# Patient Record
Sex: FEMALE | Race: OTHER | HISPANIC OR LATINO | Employment: OTHER | URBAN - METROPOLITAN AREA
[De-identification: names, ages, dates, MRNs, and addresses within clinical notes are randomized per-mention and may not be internally consistent; named-entity substitution may affect disease eponyms.]

---

## 2018-07-28 ENCOUNTER — HOSPITAL ENCOUNTER (EMERGENCY)
Facility: HOSPITAL | Age: 78
Discharge: LEFT BEFORE DISCHARGE | End: 2018-07-28
Attending: EMERGENCY MEDICINE
Payer: MEDICARE

## 2018-07-28 VITALS
HEART RATE: 68 BPM | DIASTOLIC BLOOD PRESSURE: 81 MMHG | SYSTOLIC BLOOD PRESSURE: 160 MMHG | OXYGEN SATURATION: 98 % | RESPIRATION RATE: 16 BRPM | TEMPERATURE: 98.3 F

## 2018-07-28 DIAGNOSIS — I10 HYPERTENSION: Primary | ICD-10-CM

## 2018-07-28 DIAGNOSIS — F41.9 ANXIETY: ICD-10-CM

## 2018-07-28 PROCEDURE — 99283 EMERGENCY DEPT VISIT LOW MDM: CPT

## 2018-07-28 RX ORDER — LISINOPRIL 20 MG/1
20 TABLET ORAL DAILY
COMMUNITY

## 2018-07-28 RX ORDER — PRAVASTATIN SODIUM 20 MG
20 TABLET ORAL DAILY
COMMUNITY

## 2018-07-29 NOTE — ED NOTES
Patient out in the syed requesting to see her son and wants to leave  MD made aware of same         Darwin Gautam, SREEKANTH  07/28/18 2041

## 2018-07-29 NOTE — ED NOTES
Patient walking around halls without difficulty  No distress noted       Luz Borden RN  07/28/18 2004

## 2018-07-29 NOTE — DISCHARGE INSTRUCTIONS
Ansiedad   LO QUE NECESITA SABER:   La ansiedad es nataliia condición que provoca que usted sienta preocupación o miedo  El estrés familiar o laboral, fumar, la cafeína y el alcohol pueden aumentar stanley riesgo para sufrir ansiedad  Ciertos medicamentos o condiciones de [address] pueden aumentar stanley riesgo  La ansiedad podría comenzar gradualmente y puede convertirse en nataliia condición a alicia plazo si no se controla o se trata  INSTRUCCIONES [facility]BRE [facility]:   Llame al 911 si presenta:   · Usted tiene dolor de pecho, presión o pesadez que pueden llegar a propagarse a neda hombros, brazos, mandíbula, alexis o espalda    · Usted siente deseos de lastimarse o lastimar a alguien más  Busque atención médica de inmediato si:   · Se siente mareado, aturdido o que se va a desmayar  Pregúntele a stanley [employer] vitaminas y minerales son adecuados para usted  · Neda síntomas empeoran o no mejoran con el tratamiento  · Usted piensa que stanley medicamento podría estar provocándole efectos secundarios  · Stanley ansiedad benjy que realice neda actividades diarias  · Tiene nuevos síntomas desde stanley última consulta  · Usted tiene preguntas o inquietudes acerca de stanley condición o cuidado  Medicamentos:   · Medicamentos  para ayudarle a sentirse más calmado y relajado y disminuir neda síntomas  · [place] neda medicamentos leigh se le haya indicado  Llame a stanley médico si usted piensa que el medicamento no está ayudando o si tiene efectos secundarios  Infórmele si es alérgico a cualquier medicamento  Mantenga nataliia lista actualizada de los [place], las vitaminas y los productos herbales que cl  Incluya los siguientes datos de los medicamentos: cantidad, frecuencia y motivo de administración  Traiga con usted la lista o los envases de la píldoras a neda citas de seguimiento  Lleve la lista de los medicamentos con usted en ovidio de nataliia emergencia    Programe nataliia mateus con stanley médico dentro de 2 semanas o leigh se le indique: Anote margarita preguntas para que se acuerde de hacerlas lima margarita visitas  Maneje la ansiedad:   · Vaya a terapia leigh se le indique  La terapia cognitiva conceptual puede ayudarlo a entender y cambiar la forma que usted reacciona a los eventos que provocan margarita síntomas  · Busque maneras de controlar margarita síntomas  Las [employer] leigh el ejercicio, meditación o escuchar música pueden ayudarlo a relajarse  · Practique la respiración profunda  La respiración puede cambiar la forma leigh reacciona sanders cuerpo al estrés  Enfóquese en respirar lento y profundo varias veces al día, o lima un ataque de ansiedad  Respire por la nariz y exhale por la boca  · No fume  La nicotina puede aumentar sanders ansiedad  No use cigarrillos electrónicos o tabaco sin humo en vez de cigarrillos o para tratar de dejar de fumar  Todos estos aún contienen nicotina  Pida a sanders médico información si usted fuma actualmente y [place] para dejar de hacerlo  · No consuma cafeína  La cafeína puede empeorar margarita síntomas  No consuma alimentos o bebidas que tengan el propósito de aumentar sanders nivel de [place]  · Limite o no consuma bebidas alcohólicas  Pregúntele a sanders médico si usted puede callie alcohol  Es posible que usted no pueda ingerir alcohol si cl ciertos medicamentos para la ansiedad o la depresión  Limite el consumo de alcohol a 1 trago por día si es reed  Si usted es hombre, limite el consumo de alcohol a 2 tragos por día  Un trago equivale a 12 onzas de cerveza, 5 onzas de vino o 1 onza y ½ de licor  © 2016 [address] is for End User's use only and may not be sold, redistributed or otherwise used for commercial purposes  All illustrations and images included in CareNotes® are the copyrighted property of A D A M , Inc  or Jas Oquendo  Esta información es sólo para uso en educación  Sanders intención no es darle un consejo médico sobre enfermedades o tratamientos   Colsulte con stanley [employer] farmacéutico antes de seguir cualquier régimen médico para saber si es seguro y efectivo para usted  Hipertensión crónica, Cuidados ambulatorios   INFORMACIÓN GENERAL:   La hipertensión crónica  es nataliia condición a alicia plazo en la cual stanley presión arterial es más anam de lo normal  La presión arterial es la fuerza que ejerce la aurora contra las lerma de las arterias  La hipertensión es nataliia presión arterial de 140/90 o mucho más elevada  Los siguientes son los síntomas más comunes:   · Dolor de kelly     · Visión borrosa     · Dolor en el pecho     · [employer] o debilidad    · Dificultad para respirar     · Sangrados nasales  Busque atención médica inmediata al presentar los siguientes síntomas:   · Liya dolor de kelly o pérdida de la visión    · Debilidad en un brazo o nataliia pierna     · Confusión o tiene dificultad para hablar claramente    · Siente nataliia molestia en stanley pecho que parece leigh si lo estuvieran apretando, nataliia presión, nataliia pesadez o dolor     · De repente se siente mareado o tiene dificultad para respirar    · Dolor o incomodidad en stanley espalda, alexis, mandíbula, estómago o brazo  El tratamiento para la hipertensión crónica  puede incluir un medicamento para baja la presión arterial  También necesitará hacer un cambio en stanley estilo de brittany  Se debe callie margarita medicamentos exactamente leigh se lo indicaron  Controle la hipertensión crónica:   · Tómese la presión arterial en stanley casa  Siéntese y descanse por 5 minutos antes de tomarse la presión arterial  Extienda stanley brazo y apóyelo en nataliia superficie plana  Stanley brazo debe estar a la misma altura que stanley corazón  Siga las instrucciones que vienen con el monitor para la presión arterial o tensiómetro  Si es posible tome por lo menos 2 lecturas de la presión cada vez  Tómese la presión arterial por lo [employer] al día a la misma hora todos los días, por ejemplo nataliia en la mañana y la otra en la noche   Mantenga un registro de margarita lecturas de stanley presión arterial y llévelo consigo a margarita consultas de control  · Consuma menos sodio  No le añada sodio a los alimentos  Limete el consumo de alimentos que contienen un alto nivel de sodio, leigh los alimentos [place], papitas saladas de [employer]ro[employer], y rosalba para sandwich  Stanley proveedor de lou puede recomendarle que siga el régimen de alimentación denominada enfoque dietético para disminuir la hipertensión (o DASH, por margarita siglas en inglés)  El régimen es bajo en sodio, grasas saturadas y las grasas en total  Es anam en potasio, calcio y [place]  · [place] regular  Freya actividad física que sobrepase los 30 minutos al día jose todos los días de la [place] ayudará a bajar stanley presión arterial  Solicítele a stanley proveedor de [employer] recomiende el plan de actividad física que se ajuste a stanley situación personal      · Limite el consumo de bebidas alcohólicas  Las mujeres deberían limitar stanley consumo de alcohol a 1 trago por día  Los hombres deberían limitar stanley consumo de alcohol a 2 tragos por día  Se considera un trago de alcohol 12 onzas (350 ml) de cerveza, 5 onzas (150 ml) de vino o 1 ½ onza (45 ml) de licor  · No fume  Si usted fuma, nunca es tarde para dejar de fumar  El tabaco puede aumentar stanley presión arterial  El tabaquismo también puede empeorar otras afecciones de lou que usted padezca las cuales pueden aumentar stanley riesgo de presentar hipertensión  Solicite a stanley proveedor de [employer] información si requiere ayuda para dejar de fumar  Elena Cynthia a stanley mateus de control con stanley proveedor de [employer] se lo indicaron:  Usted necesitará regresar para que le revisen stanley presión arterial y para que le ordenen otros exámenes de laboratorio  Escriba las preguntas que tenga para que recuerde hacerlas lima margarita consultas médicas  ACUERDOS SOBRE STANLEY CUIDADO:   Anatoly tiene el derecho de participar en la planificación de stanley cuidado   Aprenda todo lo que pueda [facility] y leigh darle [place]  Discuta con margarita médicos margarita opciones de tratamiento para juntos decidir el cuidado que usted quiere recibir  Usted siempre tiene el derecho a rechazar stanley tratamiento  Esta información es sólo para uso en educación  Stanley intención no es darle un consejo médico sobre enfermedades o tratamientos  Colsulte con stanley Pal[employer] farmacéutico antes de seguir cualquier régimen médico para saber si es seguro y efectivo para usted  © 2014 [address] is for End User's use only and may not be sold, redistributed or otherwise used for commercial purposes  All illustrations and images included in CareNotes® are the copyrighted property of A NESSA A RAHAT , Inc  or Jas Oquendo

## 2018-07-29 NOTE — ED PROVIDER NOTES
History  Chief Complaint   Patient presents with    High Blood Pressure     79-year-old female presents to the emergency department to check on her son who was just brought in after drug and alcohol intoxication  She has a history of hypertension and is very anxious about his drug use  She was here to visit him and stated she thought that her blood pressure might be up  She takes lisinopril 20 mg daily  She denies any headache, chest pain, shortness of breath, or any other symptoms related to her elevated blood pressure  Her primary complaint at this time is that she is very hungry and she thinks that if she eats her blood pressure will come down  Prior to Admission Medications   Prescriptions Last Dose Informant Patient Reported? Taking?   lisinopril (ZESTRIL) 20 mg tablet 7/28/2018 at Unknown time  Yes Yes   Sig: Take 20 mg by mouth daily   pravastatin (PRAVACHOL) 20 mg tablet 7/28/2018 at Unknown time  Yes Yes   Sig: Take 20 mg by mouth daily      Facility-Administered Medications: None       Past Medical History:   Diagnosis Date    Diabetes mellitus ([facility] [address] )     Gastritis     Hyperlipidemia     Hypertension        Past Surgical History:   Procedure Laterality Date    CHOLECYSTECTOMY         History reviewed  No pertinent family history  I have reviewed and agree with the history as documented  Social History   Substance Use Topics    Smoking status: Never Smoker    Smokeless tobacco: Never Used    Alcohol use No        Review of Systems   Constitutional: Negative for appetite change, chills, fatigue and fever  HENT: Negative for postnasal drip, sinus pain and trouble swallowing  Eyes: Negative for redness and itching  Respiratory: Negative for chest tightness, shortness of breath and wheezing  Cardiovascular: Negative for chest pain and leg swelling  Gastrointestinal: Negative for abdominal pain, constipation, diarrhea, nausea and vomiting  Endocrine: Negative  Genitourinary: Negative for difficulty urinating and dysuria  Musculoskeletal: Negative for back pain and myalgias  Skin: Negative for rash  Allergic/Immunologic: Negative  Neurological: Negative for dizziness, numbness and headaches  Hematological: Negative  Psychiatric/Behavioral: Negative  Physical Exam  Physical Exam   Constitutional: She is oriented to person, place, and time  She appears well-developed and well-nourished  HENT:   Head: Normocephalic and atraumatic  Nose: Nose normal    Mouth/Throat: Oropharynx is clear and moist    Eyes: Conjunctivae and EOM are normal  Pupils are equal, round, and reactive to light  Neck: Normal range of motion  Neck supple  Cardiovascular: Normal rate, regular rhythm and normal heart sounds  Pulmonary/Chest: Effort normal and breath sounds normal  No respiratory distress  She has no wheezes  Abdominal: Soft  Bowel sounds are normal  There is no tenderness  There is no guarding  Musculoskeletal: She exhibits no edema, tenderness or deformity  Neurological: She is alert and oriented to person, place, and time  No cranial nerve deficit or sensory deficit  She exhibits normal muscle tone  Coordination normal    Skin: Skin is warm and dry  Capillary refill takes less than 2 seconds  No rash noted  Psychiatric: She has a normal mood and affect  Her behavior is normal    Nursing note and vitals reviewed        Vital Signs  ED Triage Vitals [07/28/18 1948]   Temperature Pulse Respirations Blood Pressure SpO2   98 3 °F (36 8 °C) 68 16 160/81 98 %      Temp Source Heart Rate Source Patient Position - Orthostatic VS BP Location FiO2 (%)   Temporal Monitor Sitting Right arm --      Pain Score       --           Vitals:    07/28/18 1948   BP: 160/81   Pulse: 68   Patient Position - Orthostatic VS: Sitting       Visual Acuity      ED Medications  Medications - No data to display    Diagnostic Studies  Results Reviewed     None                 No orders to display              Procedures  Procedures       Phone Contacts  ED Phone Contact    ED Course                               MDM  Number of Diagnoses or Management Options  Anxiety:   Hypertension:   Diagnosis management comments: Patient had requested to be given food because she thought this would help her blood pressure  Seems that her primary issue that she is very anxious about her son's well-being  Blood pressure is not dangerously elevated at this time  She is now requesting that she be allowed to leave immediately and does not want stay to finish her meal nor to have any type of medical workup or treatment  She is aware of the need to follow up with her family physician as well as reasons for return to the emergency department  CritCare Time    Disposition  Final diagnoses:   None     ED Disposition     None      Follow-up Information    None         Patient's Medications   Discharge Prescriptions    No medications on file     No discharge procedures on file      ED Provider  Electronically Signed by           Amrit Nye DO  07/28/18 7019

## 2018-09-05 ENCOUNTER — HOSPITAL ENCOUNTER (EMERGENCY)
Facility: HOSPITAL | Age: 78
Discharge: HOME | End: 2018-09-05
Attending: EMERGENCY MEDICINE | Admitting: EMERGENCY MEDICINE
Payer: MEDICARE

## 2018-09-05 ENCOUNTER — APPOINTMENT (EMERGENCY)
Dept: RADIOLOGY | Facility: HOSPITAL | Age: 78
End: 2018-09-05
Payer: MEDICARE

## 2018-09-05 VITALS
HEIGHT: 56 IN | DIASTOLIC BLOOD PRESSURE: 92 MMHG | BODY MASS INDEX: 32.04 KG/M2 | OXYGEN SATURATION: 98 % | WEIGHT: 142.42 LBS | SYSTOLIC BLOOD PRESSURE: 159 MMHG | RESPIRATION RATE: 18 BRPM | TEMPERATURE: 97.7 F | HEART RATE: 86 BPM

## 2018-09-05 DIAGNOSIS — M25.511 SHOULDER PAIN, RIGHT: Primary | ICD-10-CM

## 2018-09-05 DIAGNOSIS — S40.011A CONTUSION OF RIGHT SHOULDER, INITIAL ENCOUNTER: ICD-10-CM

## 2018-09-05 PROCEDURE — 99283 EMERGENCY DEPT VISIT LOW MDM: CPT

## 2018-09-05 PROCEDURE — 71045 X-RAY EXAM CHEST 1 VIEW: CPT

## 2018-09-05 PROCEDURE — 73030 X-RAY EXAM OF SHOULDER: CPT

## 2018-09-06 NOTE — ED PROVIDER NOTES
History  Chief Complaint   Patient presents with    Shoulder Injury     patient states that she fell this morning and has pain in the right shoulder and arm, denies hitting head or any LOC     75-year-old female presenting with right shoulder pain  Patient states she was leaving the house to go get milk when she tripped and fell onto the knees and then straight forward with both arms out  She denies hitting her head or loss of consciousness  She reports pain localized at the right shoulder  No numbness tingling or weakness  No previous injury of the right upper extremity            Prior to Admission Medications   Prescriptions Last Dose Informant Patient Reported? Taking?   lisinopril (ZESTRIL) 20 mg tablet   Yes No   Sig: Take 20 mg by mouth daily   pravastatin (PRAVACHOL) 20 mg tablet   Yes No   Sig: Take 20 mg by mouth daily      Facility-Administered Medications: None       Past Medical History:   Diagnosis Date    Gastritis     Hyperlipidemia     Hypertension        Past Surgical History:   Procedure Laterality Date    CHOLECYSTECTOMY         History reviewed  No pertinent family history  I have reviewed and agree with the history as documented  Social History   Substance Use Topics    Smoking status: Never Smoker    Smokeless tobacco: Never Used    Alcohol use No        Review of Systems   All other systems reviewed and are negative  Physical Exam  Physical Exam   Constitutional: She is oriented to person, place, and time  She appears well-developed and well-nourished  No distress  HENT:   Head: Normocephalic and atraumatic  Eyes: Conjunctivae are normal    EOM grossly intact   Neck: Normal range of motion  Neck supple  No JVD present  Cardiovascular: Normal rate  Pulmonary/Chest: Effort normal    Abdominal: Soft     Musculoskeletal:   FROM right upper extremity, no deformity, no tenderness to palpation right shoulder, no erythema ecchymosis or edema, radial and ulnar pulses intact right upper extremity, steady gait, cap refill brisk, strength and sensation grossly intact throughout   Neurological: She is alert and oriented to person, place, and time  Skin: Skin is warm and dry  Capillary refill takes less than 2 seconds  Psychiatric: She has a normal mood and affect  Her behavior is normal    Nursing note and vitals reviewed  Vital Signs  ED Triage Vitals [09/05/18 1957]   Temperature Pulse Respirations Blood Pressure SpO2   97 7 °F (36 5 °C) 86 18 159/92 98 %      Temp Source Heart Rate Source Patient Position - Orthostatic VS BP Location FiO2 (%)   Tympanic Monitor Sitting Left arm --      Pain Score       4           Vitals:    09/05/18 1957   BP: 159/92   Pulse: 86   Patient Position - Orthostatic VS: Sitting       Visual Acuity      ED Medications  Medications - No data to display    Diagnostic Studies  Results Reviewed     None                 XR shoulder 2+ views RIGHT    (Results Pending)   XR chest 1 view portable    (Results Pending)              Procedures  Procedures       Phone Contacts  ED Phone Contact    ED Course                               MDM  Number of Diagnoses or Management Options  Contusion of right shoulder, initial encounter:   Shoulder pain, right:   Diagnosis management comments: 67 yo F presenting after trip and fall onto knees and then onto her stomach with R shoulder pain, xray negative for fx only showing degenerative changes, no tenderness to palpation, pt with full active ROM, pt did not hit her head or LOC, no gross deformity or step off, no ecchymosis or erythema on PE, take tylenol as needed, f/u with pcp and ortho    All labs and imaging discussed with patient, strict return to ED precautions discussed  Pt verbalizes understanding and agrees with plan  Pt is stable for discharge    Portions of the record may have been created with voice recognition software   Occasional wrong word or "sound a like" substitutions may have occurred due to the inherent limitations of voice recognition software  Read the chart carefully and recognize, using context, where substitutions have occurred  CritCare Time    Disposition  Final diagnoses:   Shoulder pain, right   Contusion of right shoulder, initial encounter     Time reflects when diagnosis was documented in both MDM as applicable and the Disposition within this note     Time User Action Codes Description Comment    9/5/2018  8:57 PM Omid Aden Add [M25 511] Shoulder pain, right     9/5/2018  8:57 PM Yoli RICHARDS Add [S40 011A] Contusion of right shoulder, initial encounter       ED Disposition     ED Disposition Condition Comment    Discharge  [facility] discharge to home/self care  Condition at discharge: Good        Follow-up Information     Follow up With Specialties Details Why [id number] [facility] Schedule an appointment as soon as possible for a visit If symptoms worsen [phone] [phone]      [address] Orthopedic Surgery Schedule an appointment as soon as possible for a visit If symptoms worsen [place] [phone]  [phone]          Discharge Medication List as of 9/5/2018  8:57 PM      CONTINUE these medications which have NOT CHANGED    Details   lisinopril (ZESTRIL) 20 mg tablet Take 20 mg by mouth daily, Historical Med      pravastatin (PRAVACHOL) 20 mg tablet Take 20 mg by mouth daily, Historical Med           No discharge procedures on file      ED Provider  Electronically Signed by           Cristian Herman PA-C  09/05/18 8873

## 2018-09-06 NOTE — DISCHARGE INSTRUCTIONS
Contusión en adultos   LO QUE NECESITA SABER:   Davon Moise contusión es un moretón que aparece en la piel después de nataliia Buck Broccoli  Un moretón se forma cuando se rompen los vasos sanguíneos pequeños, nic no se rompe la piel  Cuando los vasos sanguíneos se desgarran, la aurora se filtra a los tejidos cercanos, leigh los tejidos blandos o los músculos  INSTRUCCIONES SOBRE EL [facility]ITAL[facility]:   Regrese a la alton de emergencias si:   · Le surge dificultad para  el área lesionada  · Usted tiene hormigueo o entumecimiento en el área lesionada o cerca de [place]  · El área de stanley mano o pie que se encuentra debajo del moretón se pone fría o pálida  Pregúntele a stanley [place] vitaminas y minerales son adecuados para usted  · Usted encuentra un bulto nuevo en el área lesionada  · Neda síntomas no mejoran después de 4 ó 5 días de [place]  · Usted tiene preguntas o inquietudes acerca de stanley condición o cuidado  Medicamentos:  Es posible que usted necesite alguno de los siguientes:  · AINEs (Analgésicos antiinflamatorios no esteroides)  pueden disminuir la inflamación y el dolor o la fiebre  Kezia medicamento esta disponible con o sin nataliia receta médica  Los AINEs pueden causar sangrado estomacal o problemas renales en ciertas personas  Si usted cl un medicamento anticoagulante, siempre pregúntele a stanley médico si los IFEANYI son seguros para usted  Siempre shayy la etiqueta de kezia medicamento y [place] instrucciones  · Un medicamento con receta para el dolor  podrían ser Alondra Kast  No espere a que el dolor sea muy intenso para callie el medicamento  · [place] neda medicamentos leigh se le haya indicado  Consulte con stanley médico si usted jennifer que stanley medicamento no le está ayudando o si presenta efectos secundarios  Infórmele si es alérgico a algún medicamento  Mantenga nataliia lista actualizada de los [place], las vitaminas y los productos herbales que cl   Incluya los siguientes datos de los medicamentos: cantidad, frecuencia y motivo de administración  Traiga con usted la lista o los envases de la píldoras a margarita citas de seguimiento  Lleve la lista de los medicamentos con usted en ovidio de nataliia emergencia  Acuda a margarita consultas de control con stanley médico según le indicaron  Es posible que deba regresar dentro de nataliia semana para que le vuelvan a revisar la lesión  Anote margarita preguntas para que se acuerde de hacerlas lima margarita visitas  Ayude a que stanley contusión sane:   · [address] [place] [address] menos que de [facility]  Si a usted le salieron moretones en stanley pierna o pie, es posible que deba usar muletas o un bastón para caminar  [place] le ayudará a no apoyarse en la parte lesionada del cuerpo  · Aplique hielo  para bajar la inflamación y calmar el dolor  El hielo también puede contribuir a evitar el daño de los tejidos  Use un paquete de hielo o ponga hielo molido dentro de [employer]  Cubra el hielo con nataliia toalla y colóquelo sobre el moretón lima 15 a 20 minutos cada hora o según le indiquen  · Use compresión  para apoyar Valiant Comerio y disminuir la hinchazón  Coloque un vendaje elástico alrededor de la luz elena sobre el músculo lesionado  Asegúrese de que el vendaje no quede demasiado apretado  Se debería poder meter 1 dedo entre el vendaje y la piel  · Eleve la parte lesionada de stanley cuerpo  por encima del nivel de stanley corazón para ayudar a aliviar el dolor y la inflamación  Use almohadas, cobijas o toallas enrolladas para elevar el área tan a menudo leigh sea posible  · No consuma alcohol  según las indicaciones  El alcohol puede retardar la curación  · No estire los músculos lesionados  inmediatamente después de la lesión  Pregunte a stanley médico cuándo y cómo se puede estirar con precaución después de stanley lesión  Los estiramientos suaves pueden ayudar a aumentar la flexibilidad  · No masajee el área ni utilice almohadillas térmicas  en la contusión inmediatamente después de la lesión   El calor y los masajes podrían retrasar la sanación  Sanders médico podría indicarle que aplique calor después de varios días  En abdi momento, el calor comenzará a servir para sanar la lesión  Evite otra contusión:   · Estírese y [place] en calor antes de practicar deportes o hacer ejercicio  · Use equipo de protección cuando practique deportes  Billy Masters son Tamiko Dace y las prendas [facility]  · Si comienza a hacer nataliia nueva actividad física, empiece poco a poco para darle tiempo al cuerpo de acostumbrarse  © 2017 [address] Information is for End User's use only and may not be sold, redistributed or otherwise used for commercial purposes  All illustrations and images included in CareNotes® are the copyrighted property of A D A M , Inc  or Jas Oquendo  Esta información es sólo para uso en educación  Sanders intención no es darle un consejo médico sobre enfermedades o tratamientos  Colsulte con sanders Abdoulaye Pier farmacéutico antes de seguir cualquier régimen médico para saber si es seguro y efectivo para usted

## 2020-03-20 ENCOUNTER — TRANSCRIBE ORDERS (OUTPATIENT)
Dept: ADMINISTRATIVE | Facility: HOSPITAL | Age: 80
End: 2020-03-20

## 2020-03-20 DIAGNOSIS — N64.4 BREAST PAIN, LEFT: Primary | ICD-10-CM

## 2020-03-22 ENCOUNTER — HOSPITAL ENCOUNTER (EMERGENCY)
Facility: HOSPITAL | Age: 80
Discharge: HOME | End: 2020-03-22
Attending: EMERGENCY MEDICINE | Admitting: EMERGENCY MEDICINE
Payer: MEDICARE

## 2020-03-22 VITALS
WEIGHT: 140 LBS | OXYGEN SATURATION: 100 % | HEART RATE: 87 BPM | DIASTOLIC BLOOD PRESSURE: 89 MMHG | TEMPERATURE: 97.8 F | SYSTOLIC BLOOD PRESSURE: 174 MMHG | BODY MASS INDEX: 31.39 KG/M2 | RESPIRATION RATE: 18 BRPM

## 2020-03-22 DIAGNOSIS — Z76.0 MEDICATION REFILL: Primary | ICD-10-CM

## 2020-03-22 PROCEDURE — 99284 EMERGENCY DEPT VISIT MOD MDM: CPT

## 2020-03-22 PROCEDURE — 93005 ELECTROCARDIOGRAM TRACING: CPT

## 2020-03-22 PROCEDURE — 99284 EMERGENCY DEPT VISIT MOD MDM: CPT | Performed by: PHYSICIAN ASSISTANT

## 2020-03-22 RX ORDER — MECLIZINE HCL 12.5 MG/1
50 TABLET ORAL ONCE
Status: DISCONTINUED | OUTPATIENT
Start: 2020-03-22 | End: 2020-03-22 | Stop reason: HOSPADM

## 2020-03-22 RX ORDER — ACETAMINOPHEN 500 MG
500 TABLET ORAL EVERY 6 HOURS PRN
Qty: 30 TABLET | Refills: 0 | Status: SHIPPED | OUTPATIENT
Start: 2020-03-22

## 2020-03-22 RX ORDER — ACETAMINOPHEN 325 MG/1
650 TABLET ORAL ONCE
Status: DISCONTINUED | OUTPATIENT
Start: 2020-03-22 | End: 2020-03-22 | Stop reason: HOSPADM

## 2020-03-22 NOTE — ED PROVIDER NOTES
History  Chief Complaint   Patient presents with    Dizziness     3 days, denies falls  Patient is an 24-year-old female who presents today for evaluation of multiple complaints   in the room reporting the patient is stating she has an appointment with a specialist for Tuesday of this coming week however is complaining of multiple complaints including chronic hypertension chronic hyperlipidemia all of which she would like tested at this time however denies any chest pain or shortness of breath  Patient reports only gradual dizziness ongoing intermittently for 3 days for which she has not noted exacerbating or alleviating factors, when asked multiple times what her main complaint is  Patient reports she feels as though she is off balance and reports she has had this sensation before and reports she does not take any medications for this  Patient denies any recent falls, numbness or tingling or weakness of any kind  Patient continues to ramble throughout history taking and is reporting she has a specialist appointment coming up  The patient was advised to continue to make her specialist appointments and family care doctor appointments and large discuss her chronic concerns  Patient refuses to undergo CT scanning as she reports she has had a CT scan recently  Patient also refuses blood work and refused the Tylenol and meclizine pills nursing staff attempted to provide at this facility  Patient was asked multiple times what she would like and reported she only wanted a Tylenol prescription  Prior to Admission Medications   Prescriptions Last Dose Informant Patient Reported?  Taking?   lisinopril (ZESTRIL) 20 mg tablet   Yes No   Sig: Take 20 mg by mouth daily   pravastatin (PRAVACHOL) 20 mg tablet   Yes No   Sig: Take 20 mg by mouth daily      Facility-Administered Medications: None       Past Medical History:   Diagnosis Date    Gastritis     Hyperlipidemia     Hypertension Past Surgical History:   Procedure Laterality Date    CHOLECYSTECTOMY         History reviewed  No pertinent family history  I have reviewed and agree with the history as documented  E-Cigarette/Vaping     E-Cigarette/Vaping Substances     Social History     Tobacco Use    Smoking status: Never Smoker    Smokeless tobacco: Never Used   Substance Use Topics    Alcohol use: No    Drug use: No       Review of Systems   Constitutional: Negative for chills, fatigue and fever  HENT: Negative for congestion, ear pain, rhinorrhea and sore throat  Eyes: Negative for redness  Respiratory: Negative for chest tightness and shortness of breath  Cardiovascular: Negative for chest pain and palpitations  Gastrointestinal: Negative for abdominal pain, nausea and vomiting  Genitourinary: Negative for dysuria and hematuria  Musculoskeletal: Negative  Skin: Negative for rash  Neurological: Positive for dizziness and headaches  Negative for syncope, light-headedness and numbness  Physical Exam  Physical Exam   Constitutional: She is oriented to person, place, and time  She appears well-developed and well-nourished  Short stature woman, appears stated age   HENT:   Head: Normocephalic  Eyes: Pupils are equal, round, and reactive to light  EOM are normal  No scleral icterus  Cardiovascular: Normal rate and regular rhythm  Pulmonary/Chest: Effort normal and breath sounds normal  No stridor  Abdominal: Soft  She exhibits no distension  There is no tenderness  Musculoskeletal: Normal range of motion  Neurological: She is alert and oriented to person, place, and time  No cranial nerve deficit  GCS eye subscore is 4  GCS verbal subscore is 5  GCS motor subscore is 6  Skin: Skin is warm and dry  Capillary refill takes less than 2 seconds  Psychiatric: She has a normal mood and affect  Nursing note and vitals reviewed        Vital Signs  ED Triage Vitals [03/22/20 1321] Temperature Pulse Respirations Blood Pressure SpO2   97 8 °F (36 6 °C) 87 18 (!) 174/89 100 %      Temp src Heart Rate Source Patient Position - Orthostatic VS BP Location FiO2 (%)   -- -- Sitting Left arm --      Pain Score       --           Vitals:    03/22/20 1321   BP: (!) 174/89   Pulse: 87   Patient Position - Orthostatic VS: Sitting         Visual Acuity      ED Medications  Medications   meclizine (ANTIVERT) tablet 50 mg (50 mg Oral Not Given 3/22/20 1346)   acetaminophen (TYLENOL) tablet 650 mg (650 mg Oral Not Given 3/22/20 1346)       Diagnostic Studies  Results Reviewed     None                 No orders to display              Procedures  ECG 12 Lead Documentation Only  Date/Time: 3/22/2020 2:09 PM  Performed by: Jose Burk PA-C  Authorized by: Jose Burk PA-C     Indications / Diagnosis:  Reported dizziness  ECG reviewed by me, the ED Provider: yes    Patient location:  ED  Interpretation:     Interpretation: normal    Rate:     ECG rate:  75    ECG rate assessment: normal    Rhythm:     Rhythm: sinus rhythm    Ectopy:     Ectopy: none    QRS:     QRS axis:  Left  Conduction:     Conduction: normal    ST segments:     ST segments:  Normal  T waves:     T waves: normal    Comments:      NANCIE Pascual             ED Course  ED Course as of Mar 22 1409   Sun Mar 22, 2020   1333 Patient adamantly refuses CT scan despite complaint of dizziness and headache  Interpretation by emergency department technician at bedside [facility] patient reporting she "only wants a pill for the headache"  Patient is agreeable to an EKG will obtain EKG  Neurologic exam is without abnormal findings  Patient denies any recent trauma  Will provide meclizine and an EKG as patient is adamantly refusing other workup                  Stroke Assessment     Row Name 03/22/20 1408             NIH Stroke Scale    Interval  Baseline      Level of Consciousness (1a )  0      LOC Questions (1b )  0      LOC Commands (1c )  0 Best Gaze (2 )  0      Visual (3 )  0 normal unchanged for patient      Facial Palsy (4 )  0      Motor Arm, Left (5a )  0      Motor Arm, Right (5b )  0      Motor Leg, Left (6a )  0      Motor Leg, Right (6b )  0      Limb Ataxia (7 )  0      Sensory (8 )  0      Best Language (9 )  0      Dysarthria (10 )  0      Extinction and Inattention (11 ) (Formerly Neglect)  0      Total  0          First Filed Value   TPA Decision  Patient not a TPA candidate  Patient is not a candidate options  Symptoms resolved/clearly non disabling  MDM  Number of Diagnoses or Management Options  Medication refill:   Diagnosis management comments: All imaging and/or lab testing discussed with patient, strict return to ED precautions discussed  Patient and/or family members verbalizes understanding and agrees with plan  Patient is stable for discharge     Portions of the record may have been created with voice recognition software  Occasional wrong word or "sound a like" substitutions may have occurred due to the inherent limitations of voice recognition software  Read the chart carefully and recognize, using context, where substitutions have occurred  Disposition  Final diagnoses:   Medication refill     Time reflects when diagnosis was documented in both MDM as applicable and the Disposition within this note     Time User Action Codes Description Comment    3/22/2020  1:56 PM Mariah Hogananita [Z76 [id number]] Medication refill       ED Disposition     ED Disposition Condition Date/Time Comment    Discharge Stable Sun Mar 22, 2020  1:55 PM Otilia Newberry discharge to home/self care              Follow-up Information     Follow up With Specialties Details Why Contact Info    Ryann Santos MD Family Medicine Schedule an appointment as soon as possible for a visit   [facility]             Discharge Medication List as of 3/22/2020  1:58 PM      START taking these medications    Details   acetaminophen (TYLENOL) 500 mg tablet Take 1 tablet (500 mg total) by mouth every 6 (six) hours as needed for mild pain, Starting Sun 3/22/2020, Print         CONTINUE these medications which have NOT CHANGED    Details   lisinopril (ZESTRIL) 20 mg tablet Take 20 mg by mouth daily, Historical Med      pravastatin (PRAVACHOL) 20 mg tablet Take 20 mg by mouth daily, Historical Med           No discharge procedures on file      PDMP Review     None          ED Provider  Electronically Signed by           Chastity Sargent PA-C  03/22/20 [address] [place], [place] [address]WILLIAMS  03/22/20 1059

## 2020-03-23 LAB
ATRIAL RATE: 75 BPM
P AXIS: -14 DEGREES
PR INTERVAL: 166 MS
QRS AXIS: -18 DEGREES
QRSD INTERVAL: 82 MS
QT INTERVAL: 388 MS
QTC INTERVAL: 433 MS
T WAVE AXIS: 63 DEGREES
VENTRICULAR RATE: 75 BPM

## 2020-03-23 PROCEDURE — 93010 ELECTROCARDIOGRAM REPORT: CPT | Performed by: INTERNAL MEDICINE

## 2020-03-24 ENCOUNTER — HOSPITAL ENCOUNTER (OUTPATIENT)
Dept: MAMMOGRAPHY | Facility: CLINIC | Age: 80
Discharge: HOME | End: 2020-03-24
Payer: MEDICARE

## 2020-03-24 VITALS — WEIGHT: 140 LBS | BODY MASS INDEX: 31.49 KG/M2 | HEIGHT: 56 IN

## 2020-03-24 DIAGNOSIS — N64.4 BREAST PAIN, LEFT: ICD-10-CM

## 2020-03-24 PROCEDURE — 77066 DX MAMMO INCL CAD BI: CPT

## 2020-03-24 PROCEDURE — 76642 ULTRASOUND BREAST LIMITED: CPT

## 2020-03-24 PROCEDURE — G0279 TOMOSYNTHESIS, MAMMO: HCPCS

## 2020-03-25 ENCOUNTER — TELEPHONE (OUTPATIENT)
Dept: MAMMOGRAPHY | Facility: CLINIC | Age: 80
End: 2020-03-25

## 2020-03-25 NOTE — PROGRESS NOTES
Per Dr Reyes Form, he saw pt on 3/24/20 @ [facility] for left breast pain with dx mammo/US being performed with a recommendation for left breast US Guided Biopsy  Per Dr Reyes Form these recommendations were discussed with the patient at the time of exam through the [place] interpretation line  The patient became very upset and stated that she did not want the biopsy and would not discuss the biopsy procedure  I attempted to contact the pt using the phone numbers listed in the chart - [phone] and a man answered the phone and stated that there was no one by pt's name at this #  Tried 2 other #'s that were not in service  I called the [facility] @ [phone] and spoke to Chris Hernandes who was aware of the pt as she assisted pt with getting dx imaging  I informed Keyla Ask that pt needs a left breast bx and attempted to contact her to no avail and do they have another #  I informed her of pt's reaction when told she needed a bx and that pt got very angry and left the breast center  Keyla Ask also attempted to contact pt with the same results as mine  I also informed Keyla Ask that pt would need to come to [facility] for the biopsy  However, if that was not possible pt could go to [facility] for her biopsy and I would call [facility] to inform them as they will need pt's imaging from [place]  She will send pt a letter informing pt to come to their clinic, as pt lives within walking distance, and then they will talk to her about the recommendation  Keyla Ask stated that if pt does not respond to the letter then they may send a nurse to her home  Keyla Su will keep me updated  If no update by 4/1 I will call Keyla Su back

## 2020-03-29 ENCOUNTER — APPOINTMENT (EMERGENCY)
Dept: RADIOLOGY | Facility: HOSPITAL | Age: 80
End: 2020-03-29
Payer: MEDICARE

## 2020-03-29 ENCOUNTER — HOSPITAL ENCOUNTER (EMERGENCY)
Facility: HOSPITAL | Age: 80
Discharge: HOME | End: 2020-03-29
Attending: EMERGENCY MEDICINE | Admitting: EMERGENCY MEDICINE
Payer: MEDICARE

## 2020-03-29 VITALS
DIASTOLIC BLOOD PRESSURE: 85 MMHG | HEART RATE: 81 BPM | SYSTOLIC BLOOD PRESSURE: 151 MMHG | OXYGEN SATURATION: 100 % | BODY MASS INDEX: 31.53 KG/M2 | TEMPERATURE: 97.9 F | WEIGHT: 140.65 LBS | RESPIRATION RATE: 18 BRPM

## 2020-03-29 DIAGNOSIS — R53.1 WEAKNESS: Primary | ICD-10-CM

## 2020-03-29 DIAGNOSIS — R53.83 FATIGUE: ICD-10-CM

## 2020-03-29 LAB
ALBUMIN SERPL BCP-MCNC: 3.6 G/DL (ref 3–5.2)
ALP SERPL-CCNC: 60 U/L (ref 43–122)
ALT SERPL W P-5'-P-CCNC: 35 U/L (ref 9–52)
ANION GAP SERPL CALCULATED.3IONS-SCNC: 9 MMOL/L (ref 5–14)
APTT PPP: 30 SECONDS (ref 23–37)
AST SERPL W P-5'-P-CCNC: 39 U/L (ref 14–36)
BASOPHILS # BLD AUTO: 0.05 THOUSAND/UL (ref 0–0.1)
BASOPHILS NFR MAR MANUAL: 1 % (ref 0–1)
BILIRUB SERPL-MCNC: 0.1 MG/DL
BUN SERPL-MCNC: 20 MG/DL (ref 5–25)
CALCIUM SERPL-MCNC: 8.3 MG/DL (ref 8.4–10.2)
CHLORIDE SERPL-SCNC: 81 MMOL/L (ref 97–108)
CO2 SERPL-SCNC: 30 MMOL/L (ref 22–30)
CREAT SERPL-MCNC: 0.52 MG/DL (ref 0.6–1.2)
EOSINOPHIL # BLD AUTO: 0.05 THOUSAND/UL (ref 0–0.4)
EOSINOPHIL NFR BLD MANUAL: 1 % (ref 0–6)
ERYTHROCYTE [DISTWIDTH] IN BLOOD BY AUTOMATED COUNT: 13.9 %
GFR SERPL CREATININE-BSD FRML MDRD: 91 ML/MIN/1.73SQ M
GLUCOSE SERPL-MCNC: 89 MG/DL (ref 70–99)
HCT VFR BLD AUTO: 37 % (ref 36–46)
HGB BLD-MCNC: 12.9 G/DL (ref 12–16)
INR PPP: 1.06 (ref 0.84–1.19)
LIPASE SERPL-CCNC: 258 U/L (ref 23–300)
LYMPHOCYTES # BLD AUTO: 1.86 THOUSAND/UL (ref 0.5–4)
LYMPHOCYTES # BLD AUTO: 35 % (ref 25–45)
MCH RBC QN AUTO: 30.4 PG (ref 26–34)
MCHC RBC AUTO-ENTMCNC: 34.8 G/DL (ref 31–36)
MCV RBC AUTO: 87 FL (ref 80–100)
MONOCYTES # BLD AUTO: 0.8 THOUSAND/UL (ref 0.2–0.9)
MONOCYTES NFR BLD AUTO: 15 % (ref 1–10)
NEUTS BAND NFR BLD MANUAL: 12 % (ref 0–8)
NEUTS SEG # BLD: 2.44 THOUSAND/UL (ref 1.8–7.8)
NEUTS SEG NFR BLD AUTO: 34 %
PLATELET # BLD AUTO: 275 THOUSANDS/UL (ref 150–450)
PLATELET BLD QL SMEAR: ADEQUATE
PMV BLD AUTO: 7.3 FL (ref 8.9–12.7)
POTASSIUM SERPL-SCNC: 3.4 MMOL/L (ref 3.6–5)
PROT SERPL-MCNC: 6.9 G/DL (ref 5.9–8.4)
PROTHROMBIN TIME: 13.2 SECONDS (ref 11.6–14.5)
RBC # BLD AUTO: 4.23 MILLION/UL (ref 4–5.2)
RBC MORPH BLD: NORMAL
SODIUM SERPL-SCNC: 120 MMOL/L (ref 137–147)
TOTAL CELLS COUNTED SPEC: 100
TROPONIN I SERPL-MCNC: <0.01 NG/ML (ref 0–0.03)
VARIANT LYMPHS # BLD AUTO: 2 % (ref 0–0)
WBC # BLD AUTO: 5.3 THOUSAND/UL (ref 4.5–11)

## 2020-03-29 PROCEDURE — 93005 ELECTROCARDIOGRAM TRACING: CPT

## 2020-03-29 PROCEDURE — 80053 COMPREHEN METABOLIC PANEL: CPT | Performed by: PHYSICIAN ASSISTANT

## 2020-03-29 PROCEDURE — 83690 ASSAY OF LIPASE: CPT | Performed by: PHYSICIAN ASSISTANT

## 2020-03-29 PROCEDURE — 85610 PROTHROMBIN TIME: CPT | Performed by: PHYSICIAN ASSISTANT

## 2020-03-29 PROCEDURE — 85007 BL SMEAR W/DIFF WBC COUNT: CPT | Performed by: PHYSICIAN ASSISTANT

## 2020-03-29 PROCEDURE — 99285 EMERGENCY DEPT VISIT HI MDM: CPT

## 2020-03-29 PROCEDURE — 85730 THROMBOPLASTIN TIME PARTIAL: CPT | Performed by: PHYSICIAN ASSISTANT

## 2020-03-29 PROCEDURE — 99285 EMERGENCY DEPT VISIT HI MDM: CPT | Performed by: PHYSICIAN ASSISTANT

## 2020-03-29 PROCEDURE — 36415 COLL VENOUS BLD VENIPUNCTURE: CPT | Performed by: PHYSICIAN ASSISTANT

## 2020-03-29 PROCEDURE — 84484 ASSAY OF TROPONIN QUANT: CPT | Performed by: PHYSICIAN ASSISTANT

## 2020-03-29 PROCEDURE — 71046 X-RAY EXAM CHEST 2 VIEWS: CPT

## 2020-03-29 PROCEDURE — 85027 COMPLETE CBC AUTOMATED: CPT | Performed by: PHYSICIAN ASSISTANT

## 2020-03-29 RX ADMIN — SODIUM CHLORIDE 1000 ML: 0.9 INJECTION, SOLUTION INTRAVENOUS at 16:42

## 2020-03-29 NOTE — DISCHARGE INSTRUCTIONS
CoVID-19 Jacob de cuidado domiciliario  Stanley proveedor de [employer] y/o personal de lou pública lo/la ha evaluado y ha determinado que no necesita ser hospitalizado/a en kylah momento  ? En kylah momento usted puede ser aislado/a en casa donde será monitoreado/a por el personal de stanley departamento de lou local o estatal  Debe seguir cuidadosamente los pasos de prevención y aislamiento que se indican a continuación, hasta que un proveedor de atención médica o el departamento de lou local o estatal indique que puede volver a margarita actividades normales  Quédate en casa excepto si necesita recibir [employer] que están levemente enfermas con COVID-19 pueden aislarse en casa lima stanley recuperacion  Usted debe restringir las actividades fuera de stanley hogar, excepto para recibir [employer]  No se presente al [place], a la escuela o en áreas públicas  Evite el uso del transporte público, el uso compartido de transporte o los taxis  Aislese de [employer] y [employer] en stanley domicilio  Personas: En la medida de lo posible, debe quedarte en nataliia habitación específica y lejos de otras personas en stanley hogar  Además, debe usar un baño separado, si está disponible  Animales: Debe restringir el contacto con mascotas y otros animales [address] enfermo/a con COVID-19, al igual que lo haría con [employer]  Aunque no hdz habido informes de mascotas u otros animales que se enferman con COVID-19, todavía se recomienda que las personas enfermas con COVID-19 limiten el contacto con los animales hasta que se sepa más información sobre el virus  En lo posible, pida a otro miembro de stanley hogar que cuide de margarita animales mientras esté enfermo/a  Si está enfermo/a con COVID-19, evite el contacto con stanley mascota, incluyendo acariciar, abrazar, besar o ser lamido/a, al igual que compartir alimentos   Si debe cuidar de stanley mascota o estar cerca de [employer] está enfermo/a, lávese las miki antes y KOHALIMA de interactuar con las mascotas y use nataliia máscara facial  Consulte COVID-19 y Animales para obtener más información  Llame antes de visitar a stanley médico  Si tiene [employer], llame al proveedor de [employer] [employer] y dígale que tiene o puede tener COVID-19  [place] ayudará al [employer] del proveedor de atención médica a callie medidas para evitar que otras personas se infecten o expongan  Utilize máscara facial  Debe usar mascara facial cuando esté cerca de otras personas (por ejemplo, compartir nataliia habitación o vehículo) o mascotas y antes de entrar en la oficina de un proveedor de [employer] [employer]  Si usted no puede usar máscara facial (por ejemplo, porque causa problemas para respirar), entonces las personas que viven con usted no deben permanecer en la misma habitación con usted, o deben usar máscara facial si entran a stanley habitación  Nick Dings stanley tos y/o estornudos   Cúbrase la boca y la Garold Indu con un pañuelo desechable cuando tosa o estornude  Tire los pañuelos usados en un contenedor de basura que tenga cobertura  Lávese las miki inmediatamente con agua y jabón lima al menos 21 segundos o, si no hay agua y jabón disponibles, límpiese las miki con un desinfectante de miki a base de alcohol que contenga al menos un 60% de alcohol  Límpiese las miki con frecuencia  [employer] las miki a menudo con agua y jabón lima al menos 20 segundos, especialmente después de sonarse la nariz, toser o estornudar, ir al baño, y antes de comer o preparar alimentos  Si agua y jabón no están disponibles fácilmente, utilize un desinfectante de miki a base de alcohol con al menos un 60% de alcohol, cubriendo todas las superficies de margarita miki y frotándolas hasta que se sientan secas  Cely Rodrigo y agua son la mejor opción si las miki están visiblemente sucias  Evite tocarse los ojos, la nariz y la boca con las miki sin Ronelle Loose    Evite compartir artículos personales en el hogar  No debe compartir platos, vasos para beber, tazas, utensilios para comer, toallas o ropa de cama con otras personas o mascotas en stanley hogar  Después de [employer], deben lavarse muy anthony con agua y [place]  Limpie todas las superficies "de toque frequente" todos los [address] superficies de toque fecuente incluyen encimeras o [place], mesas, escritorios, pomos o perillas de johnnie, accesorios de baño, inodoros, teléfonos, teclados, tabletas y [address] de noche  Además, limpie las superficies que puedan contener aurora, heces fecales o líquidos corporales  Utilice un spray de limpieza para el hogar o nataliia toallita desechable, de acuerdo con las instrucciones de la etiqueta del product utilizado para limpiar  Las [employer] contienen instrucciones para un uso seguro y eficaz del producto de limpieza, incluidas las precauciones que deben tomarse al aplicar el producto, incluyendo el uso de guantes y asegurarse de que haya nataliia buena ventilación lima el uso del producto  Monitorea margarita síntomas  Busca atención médica inmediata si tu enfermedad está empeorando (por ejemplo, dificultad para respirar)  Antes de buscar [employer], llame a stanley proveedor de [employer] y tommy que tiene, o está siendo evaluado para, COVID-19  [place] nataliia mascara facial antes de entrar en las instalaciones  Estos pasos ayudarán al [employer] del proveedor de atención médica a evitar que otras personas en la oficina o la alton de espera se infecten o expongan  [place] a stanley proveedor de [employer] llame al departamento de lou local o estatal  Las personas que se sometan a un seguimiento activo o que se les facilite la autosupervisión deben seguir las instrucciones proporcionadas por stanley departamento de lou local o profesionales de lou ocupacional, según sea apropiado  Si tiene nataliia emergencia médica y necesita llamar al 911, notifique al personal de despacho que tiene o está siendo evaluado para COVID-19   Si es posible, pongase nataliia mascara facial antes de que lleguen los servicios médicos de emergencia  Descontinuacion de [employer] domiciliario  Los pacientes con COVID-19 confirmados deben permanecer bajo precauciones de aislamiento domiciliario hasta que se crea que el riesgo de transmisión secundaria a otros es bajo  La decisión de suspender las precauciones de aislamiento domiciliario deben tomarse de acuerdo a cada ovidio, en consulta con los proveedores de atención médica y los departamentos de lou estatales y locales      Source: [employer] fi

## 2020-03-29 NOTE — ED PROVIDER NOTES
History  Chief Complaint   Patient presents with    Weakness - Generalized     [de-identified] yo F with PMH gastritis, hyperlipidemia and hypertension presenting for evaluation of generalized weakness  Pt reports she follows with our [facility] [place] and had same complaint and was seen for it 3 days ago on Friday  Pt states they did 'ekg and echo and I dont like how they treated me so I came here'  Pt reports generalized weakness and mucous production  Denies cough, sob, cp, abdominal pain, n/v/d, dizziness, headache, blurry vision or loss of vision  She denies any sick contacts or travel outside of the immediate area  Prior to Admission Medications   Prescriptions Last Dose Informant Patient Reported?  Taking?   acetaminophen (TYLENOL) 500 mg tablet   No No   Sig: Take 1 tablet (500 mg total) by mouth every 6 (six) hours as needed for mild pain   lisinopril (ZESTRIL) 20 mg tablet   Yes No   Sig: Take 20 mg by mouth daily   pravastatin (PRAVACHOL) 20 mg tablet   Yes No   Sig: Take 20 mg by mouth daily      Facility-Administered Medications: None       Past Medical History:   Diagnosis Date    Gastritis     Hyperlipidemia     Hypertension        Past Surgical History:   Procedure Laterality Date    CHOLECYSTECTOMY         Family History   Problem Relation Age of Onset    Breast cancer Mother     No Known Problems Father     No Known Problems Sister     No Known Problems Daughter     No Known Problems Maternal Grandmother     No Known Problems Maternal Grandfather     No Known Problems Paternal Grandmother     No Known Problems Paternal Grandfather     No Known Problems Sister     No Known Problems Sister     No Known Problems Sister     No Known Problems Sister     Breast cancer Sister     No Known Problems Sister     No Known Problems Sister     No Known Problems Sister     No Known Problems Daughter     No Known Problems Daughter     No Known Problems Daughter     No Known Problems Daughter  Prostate cancer Brother     No Known Problems Paternal Aunt     No Known Problems Paternal Aunt     No Known Problems Maternal Aunt     No Known Problems Maternal Aunt      I have reviewed and agree with the history as documented  E-Cigarette/Vaping    E-Cigarette Use Never User      E-Cigarette/Vaping Substances     Social History     Tobacco Use    Smoking status: Never Smoker    Smokeless tobacco: Never Used   Substance Use Topics    Alcohol use: No    Drug use: No       Review of Systems   All other systems reviewed and are negative  Physical Exam  Physical Exam   Constitutional: She is oriented to person, place, and time  She appears well-nourished  No distress  overweight   HENT:   Head: Normocephalic and atraumatic  Right Ear: External ear normal    Left Ear: External ear normal    Mouth/Throat: Oropharynx is clear and moist  No oropharyngeal exudate  Eyes: Conjunctivae are normal    EOM grossly intact   Neck: Normal range of motion  Neck supple  No JVD present  Cardiovascular: Normal rate and regular rhythm  Pulmonary/Chest: Effort normal  No stridor  No respiratory distress  She has no wheezes  Abdominal: Soft  She exhibits no distension  There is no tenderness  No RLQ or LLQ tenderness to palpation   Musculoskeletal:   FROM all extremities, pt ambulating from triage to exam room without difficulty or distress, steady gait, cap refill brisk, strength and sensation grossly intact throughout   Neurological: She is alert and oriented to person, place, and time  Skin: Skin is warm and dry  Capillary refill takes less than 2 seconds  She is not diaphoretic  Psychiatric: She has a normal mood and affect  Her behavior is normal    Nursing note and vitals reviewed        Vital Signs  ED Triage Vitals [03/29/20 1533]   Temperature Pulse Respirations Blood Pressure SpO2   97 9 °F (36 6 °C) 81 18 151/85 100 %      Temp Source Heart Rate Source Patient Position - Orthostatic VS BP Location FiO2 (%)   Tympanic Monitor Sitting Left arm --      Pain Score       --           Vitals:    03/29/20 1533   BP: 151/85   Pulse: 81   Patient Position - Orthostatic VS: Sitting         Visual Acuity      ED Medications  Medications   sodium chloride 0 9 % bolus 1,000 mL (0 mL Intravenous Stopped 3/29/20 1709)       Diagnostic Studies  Results Reviewed     Procedure Component Value Units Date/Time    Lipase [[id number]]  (Normal) Collected:  03/29/20 1639    Lab Status:  Final result Specimen:  Blood from Arm, Right Updated:  03/29/20 1710     Lipase 258 u/L     Protime-INR [[id number]]  (Normal) Collected:  03/29/20 1639    Lab Status:  Final result Specimen:  Blood from Arm, Right Updated:  03/29/20 1707     Protime 13 2 seconds      INR 1 06    APTT [[id number]]  (Normal) Collected:  03/29/20 1639    Lab Status:  Final result Specimen:  Blood from Arm, Right Updated:  03/29/20 1707     PTT 30 seconds     CBC and differential [[id number]]  (Abnormal) Collected:  03/29/20 1639    Lab Status:  Final result Specimen:  Blood from Arm, Right Updated:  03/29/20 1706     WBC 5 30 Thousand/uL      RBC 4 23 Million/uL      Hemoglobin 12 9 g/dL      Hematocrit 37 0 %      MCV 87 fL      MCH 30 4 pg      MCHC 34 8 g/dL      RDW 13 9 %      MPV 7 3 fL      Platelets 280 Thousands/uL     Comprehensive metabolic panel [[id number]] Collected:  03/29/20 1639    Lab Status: In process Specimen:  Blood from Arm, Right Updated:  03/29/20 1657    Troponin I [[id number]] Collected:  03/29/20 1639    Lab Status: In process Specimen:  Blood from Arm, Right Updated:  03/29/20 1657    UA w Reflex to Microscopic w Reflex to Culture [[id number]]     Lab Status:  No result Specimen:  Urine, Other                  XR chest 2 views   Final Result by Poncho Braun MD (03/29 1641)      No acute cardiopulmonary disease              Workstation performed: [id number]                    Procedures  ECG 12 Lead Documentation Only  Date/Time: 3/29/2020 4:18 PM  Performed by: Penny Delaney PA-C  Authorized by: Penny Delaney PA-C     Indications / Diagnosis:  Weakness  ECG reviewed by me, the ED Provider: yes    Patient location:  ED  Interpretation:     Interpretation: normal    Rate:     ECG rate:  68    ECG rate assessment: normal    Rhythm:     Rhythm: sinus rhythm    Ectopy:     Ectopy: none    QRS:     QRS axis:  Normal  Conduction:     Conduction: normal    ST segments:     ST segments:  Normal  T waves:     T waves: normal    Comments:      CHELSEY 732             ED Course  ED Course as of Mar 29 1712   Sun Mar 29, 2020   1701 Pt asking when or if she can leave so she can call her son to pick her up, labs all in process, cxr read as normal by radiologist      1703 Pt asking to leave, no labs received at this point, will have her leave AMA      1708 Pt signed AMA paperwork and now stating all she wanted was a refill of her cough medication, I advised she can get from the drug store and does not need medication, she is changing and given d/c papers                                    MDM  Number of Diagnoses or Management Options  Fatigue:   Weakness:   Diagnosis management comments: 59-year-old female presenting for evaluation of a generalized weakness, she states that she usually follows up with our lady of [facility] and had a full workup 3 days ago but did not like how they treated her so she came here, pt wanting to leave, in and out of the room beforeRN was even able to obtain labs, cxr normal showing no signs of covid, labs not resulted by the time pt was d/c, she signed out AMA stating all she really wanted was refill of her cough medication, she is in no acute distress, f/u with pcp as an outpatient    Portions of the record may have been created with voice recognition software  Occasional wrong word or "sound a like" substitutions may have occurred due to the inherent limitations of voice recognition software   Read the chart carefully and recognize, using context, where substitutions have occurred  The patient has requested to leave the ED against medical advice  I believe this patient is of sound mind and competent to refuse medical care  The patient is responding and asking questions appropriately  The patient is oriented to person, place and time  The patient is not psychotic, delusional, suicidal, homicidal or hallucinating  The patient demonstrates a normal mental capacity to make decisions regarding their healthcare  The patient is clinically sober and does not appear to be under the influence of any illicit drugs at this time  The patient has been advised of the risks, in layman terms, of leaving AMA which include, but are not limited to death, coma, permanent disability, loss of current lifestyle, delay in diagnosis  Alternatives have been offered - the patient remains steadfast in their wish to leave  The patient has been advised that should they change their mind they are welcome to return to this hospital, or any other, at any time  The patient understands that in no way does an [place] discharge mean that I do not want them to have the best medical care available  To this end, I have provided appropriate prescriptions, referrals, and discharge instructions  The patient did sign AMA paperwork  The above discussion was witnessed by another member of staff               Disposition  Final diagnoses:   Weakness   Fatigue     Time reflects when diagnosis was documented in both MDM as applicable and the Disposition within this note     Time User Action Codes Description Comment    3/29/2020  5:03 PM Dayna Peters Add [R53 1] Weakness     3/29/2020  5:04 PM Dayna Peters Add [R53 83] Fatigue       ED Disposition     ED Disposition Condition Date/Time Comment    BRIANNE Weeks Mar 29, 2020  5:03 PM Date: 3/29/2020  Patient: Tsering Meth  Admitted: 3/29/2020  3:27 PM  Attending Provider: Ravindra Nixon,     Tsering Meth or her authorized caregiver has made the decision for the patient to leave the emergency department against  the advice of the emergency department staff  She or her authorized caregiver has been informed and understands the inherent risks, including death  She or her authorized caregiver has decided to accept the responsibility for this decision  Shade Sondra and all necessary parties have been advised that she may return for further evaluation or treatment  Her condition at time of discharge was stable  [facility] had current vital signs as follows:  /85 (BP Location: Left  arm)   Pulse 81   Temp 97 9 °F (36 6 °C) (Tympanic)   Resp 18   Wt 63 8 kg (140 lb 10 5 oz)         Follow-up Information     Follow up With Specialties Details Why Contact Info Additional [address] Call in 1 day  [address] [place] [address], [address] [facility] [id number]  [address], [address] [place] [address] [address], [address] [place], [place], [address]          Patient's Medications   Discharge Prescriptions    No medications on file     No discharge procedures on file      PDMP Review     None          ED Provider  Electronically Signed by           Trace Lima PA-C  03/29/20 6264

## 2020-03-30 LAB
ATRIAL RATE: 68 BPM
P AXIS: 74 DEGREES
PR INTERVAL: 154 MS
QRS AXIS: -16 DEGREES
QRSD INTERVAL: 84 MS
QT INTERVAL: 404 MS
QTC INTERVAL: 429 MS
T WAVE AXIS: 20 DEGREES
VENTRICULAR RATE: 68 BPM

## 2020-03-30 PROCEDURE — 93010 ELECTROCARDIOGRAM REPORT: CPT | Performed by: INTERNAL MEDICINE

## 2020-04-07 ENCOUNTER — HOSPITAL ENCOUNTER (EMERGENCY)
Facility: HOSPITAL | Age: 80
Discharge: HOME | End: 2020-04-07
Attending: EMERGENCY MEDICINE | Admitting: EMERGENCY MEDICINE
Payer: MEDICARE

## 2020-04-07 VITALS
BODY MASS INDEX: 30.45 KG/M2 | RESPIRATION RATE: 20 BRPM | HEART RATE: 94 BPM | DIASTOLIC BLOOD PRESSURE: 102 MMHG | WEIGHT: 135.8 LBS | SYSTOLIC BLOOD PRESSURE: 159 MMHG | TEMPERATURE: 97.6 F | OXYGEN SATURATION: 96 %

## 2020-04-07 DIAGNOSIS — E87.1 HYPONATREMIA: Primary | ICD-10-CM

## 2020-04-07 DIAGNOSIS — R06.02 SHORTNESS OF BREATH: ICD-10-CM

## 2020-04-07 LAB
ANION GAP SERPL CALCULATED.3IONS-SCNC: 10 MMOL/L (ref 5–14)
BASOPHILS # BLD AUTO: 0 THOUSANDS/ΜL (ref 0–0.1)
BASOPHILS NFR BLD AUTO: 1 % (ref 0–1)
BUN SERPL-MCNC: 14 MG/DL (ref 5–25)
CALCIUM SERPL-MCNC: 9 MG/DL (ref 8.4–10.2)
CHLORIDE SERPL-SCNC: 88 MMOL/L (ref 97–108)
CO2 SERPL-SCNC: 27 MMOL/L (ref 22–30)
CREAT SERPL-MCNC: 0.49 MG/DL (ref 0.6–1.2)
D DIMER PPP FEU-MCNC: 0.49 UG/ML FEU
EOSINOPHIL # BLD AUTO: 0.1 THOUSAND/ΜL (ref 0–0.4)
EOSINOPHIL NFR BLD AUTO: 1 % (ref 0–6)
ERYTHROCYTE [DISTWIDTH] IN BLOOD BY AUTOMATED COUNT: 13.7 %
GFR SERPL CREATININE-BSD FRML MDRD: 92 ML/MIN/1.73SQ M
GLUCOSE SERPL-MCNC: 145 MG/DL (ref 70–99)
HCT VFR BLD AUTO: 38.6 % (ref 36–46)
HGB BLD-MCNC: 13.5 G/DL (ref 12–16)
LYMPHOCYTES # BLD AUTO: 1.9 THOUSANDS/ΜL (ref 0.5–4)
LYMPHOCYTES NFR BLD AUTO: 30 % (ref 25–45)
MCH RBC QN AUTO: 30.1 PG (ref 26–34)
MCHC RBC AUTO-ENTMCNC: 34.8 G/DL (ref 31–36)
MCV RBC AUTO: 86 FL (ref 80–100)
MONOCYTES # BLD AUTO: 0.6 THOUSAND/ΜL (ref 0.2–0.9)
MONOCYTES NFR BLD AUTO: 10 % (ref 1–10)
NEUTROPHILS # BLD AUTO: 3.7 THOUSANDS/ΜL (ref 1.8–7.8)
NEUTS SEG NFR BLD AUTO: 59 % (ref 45–65)
PLATELET # BLD AUTO: 343 THOUSANDS/UL (ref 150–450)
PMV BLD AUTO: 7.5 FL (ref 8.9–12.7)
POTASSIUM SERPL-SCNC: 3.3 MMOL/L (ref 3.6–5)
RBC # BLD AUTO: 4.47 MILLION/UL (ref 4–5.2)
SODIUM SERPL-SCNC: 125 MMOL/L (ref 137–147)
WBC # BLD AUTO: 6.4 THOUSAND/UL (ref 4.5–11)

## 2020-04-07 PROCEDURE — 99285 EMERGENCY DEPT VISIT HI MDM: CPT

## 2020-04-07 PROCEDURE — 80048 BASIC METABOLIC PNL TOTAL CA: CPT | Performed by: PHYSICIAN ASSISTANT

## 2020-04-07 PROCEDURE — 85025 COMPLETE CBC W/AUTO DIFF WBC: CPT | Performed by: PHYSICIAN ASSISTANT

## 2020-04-07 PROCEDURE — 36415 COLL VENOUS BLD VENIPUNCTURE: CPT | Performed by: PHYSICIAN ASSISTANT

## 2020-04-07 PROCEDURE — 85379 FIBRIN DEGRADATION QUANT: CPT | Performed by: PHYSICIAN ASSISTANT

## 2020-04-07 PROCEDURE — 99283 EMERGENCY DEPT VISIT LOW MDM: CPT | Performed by: PHYSICIAN ASSISTANT

## 2020-04-07 RX ORDER — ALBUTEROL SULFATE 90 UG/1
2 AEROSOL, METERED RESPIRATORY (INHALATION) ONCE
Status: COMPLETED | OUTPATIENT
Start: 2020-04-07 | End: 2020-04-07

## 2020-04-07 RX ADMIN — ALBUTEROL SULFATE 2 PUFF: 90 AEROSOL, METERED RESPIRATORY (INHALATION) at 15:10

## 2020-04-23 ENCOUNTER — TELEPHONE (OUTPATIENT)
Dept: MAMMOGRAPHY | Facility: CLINIC | Age: 80
End: 2020-04-23

## 2020-05-11 ENCOUNTER — TELEPHONE (OUTPATIENT)
Dept: MAMMOGRAPHY | Facility: CLINIC | Age: 80
End: 2020-05-11

## 2020-05-13 ENCOUNTER — TELEPHONE (OUTPATIENT)
Dept: MAMMOGRAPHY | Facility: CLINIC | Age: 80
End: 2020-05-13

## 2021-03-27 ENCOUNTER — HOSPITAL ENCOUNTER (EMERGENCY)
Facility: HOSPITAL | Age: 81
Discharge: HOME | End: 2021-03-27
Attending: EMERGENCY MEDICINE
Payer: MEDICARE

## 2021-03-27 VITALS
BODY MASS INDEX: 29.11 KG/M2 | RESPIRATION RATE: 16 BRPM | WEIGHT: 129.85 LBS | SYSTOLIC BLOOD PRESSURE: 160 MMHG | OXYGEN SATURATION: 98 % | DIASTOLIC BLOOD PRESSURE: 83 MMHG | HEART RATE: 93 BPM | TEMPERATURE: 99.4 F

## 2021-03-27 DIAGNOSIS — L02.01 FACIAL ABSCESS: Primary | ICD-10-CM

## 2021-03-27 PROCEDURE — 99284 EMERGENCY DEPT VISIT MOD MDM: CPT | Performed by: EMERGENCY MEDICINE

## 2021-03-27 PROCEDURE — 10060 I&D ABSCESS SIMPLE/SINGLE: CPT | Performed by: EMERGENCY MEDICINE

## 2021-03-27 PROCEDURE — 99283 EMERGENCY DEPT VISIT LOW MDM: CPT

## 2021-03-27 RX ORDER — ACETAMINOPHEN 325 MG/1
975 TABLET ORAL ONCE
Status: COMPLETED | OUTPATIENT
Start: 2021-03-27 | End: 2021-03-27

## 2021-03-27 RX ORDER — LIDOCAINE HYDROCHLORIDE 10 MG/ML
5 INJECTION, SOLUTION EPIDURAL; INFILTRATION; INTRACAUDAL; PERINEURAL ONCE
Status: COMPLETED | OUTPATIENT
Start: 2021-03-27 | End: 2021-03-27

## 2021-03-27 RX ADMIN — LIDOCAINE HYDROCHLORIDE 5 ML: 10 INJECTION, SOLUTION EPIDURAL; INFILTRATION; INTRACAUDAL; PERINEURAL at 15:28

## 2021-03-27 RX ADMIN — ACETAMINOPHEN 975 MG: 325 TABLET, FILM COATED ORAL at 15:41

## 2021-03-27 NOTE — ED PROVIDER NOTES
History  Chief Complaint   Patient presents with    Facial Swelling     Pt states she may have a insect bite to her upper lip, presently there is what appears to be abcess to her upper lip that has been there for 8 days  Pt notes that her lips are more swollen than usual      Is an 80year-old female with an 8 day history of progressively worsening swelling and pain in the left upper lip  Unsure if she was bit by an insect  Was seen by her PCP a cast Jose Neve was put on Bactrim is taken approximately 3 days worth but no improvement of symptoms  Is able to eat drink swallow and speak without issues  Prior to Admission Medications   Prescriptions Last Dose Informant Patient Reported?  Taking?   acetaminophen (TYLENOL) 500 mg tablet   No No   Sig: Take 1 tablet (500 mg total) by mouth every 6 (six) hours as needed for mild pain   lisinopril (ZESTRIL) 20 mg tablet   Yes No   Sig: Take 20 mg by mouth daily   pravastatin (PRAVACHOL) 20 mg tablet   Yes No   Sig: Take 20 mg by mouth daily      Facility-Administered Medications: None       Past Medical History:   Diagnosis Date    Gastritis     Hyperlipidemia     Hypertension        Past Surgical History:   Procedure Laterality Date    CHOLECYSTECTOMY         Family History   Problem Relation Age of Onset    Breast cancer Mother     No Known Problems Father     No Known Problems Sister     No Known Problems Daughter     No Known Problems Maternal Grandmother     No Known Problems Maternal Grandfather     No Known Problems Paternal Grandmother     No Known Problems Paternal Grandfather     No Known Problems Sister     No Known Problems Sister     No Known Problems Sister     No Known Problems Sister     Breast cancer Sister     No Known Problems Sister     No Known Problems Sister     No Known Problems Sister     No Known Problems Daughter     No Known Problems Daughter     No Known Problems Daughter     No Known Problems Daughter    Heavenly Courser Prostate cancer Brother     No Known Problems Paternal Aunt     No Known Problems Paternal Aunt     No Known Problems Maternal Aunt     No Known Problems Maternal Aunt      I have reviewed and agree with the history as documented  E-Cigarette/Vaping    E-Cigarette Use Never User      E-Cigarette/Vaping Substances     Social History     Tobacco Use    Smoking status: Never Smoker    Smokeless tobacco: Never Used   Substance Use Topics    Alcohol use: No    Drug use: No       Review of Systems   Constitutional: Negative  HENT: Negative  Eyes: Negative  Respiratory: Negative  Cardiovascular: Negative  Gastrointestinal: Negative  Endocrine: Negative  Genitourinary: Negative  Musculoskeletal: Negative  Skin: Positive for wound  Allergic/Immunologic: Negative  Neurological: Negative  Hematological: Negative  Psychiatric/Behavioral: Negative  All other systems reviewed and are negative  Physical Exam  Physical Exam  Vitals signs and nursing note reviewed  Constitutional:       Appearance: Normal appearance  She is normal weight  HENT:      Head: Normocephalic and atraumatic  Right Ear: Tympanic membrane, ear canal and external ear normal       Left Ear: Tympanic membrane, ear canal and external ear normal       Nose: Nose normal       Mouth/Throat:      Mouth: Mucous membranes are moist       Pharynx: Oropharynx is clear  Comments: 2 cm abscess with sinus present on the face left of the philtrum  Eyes:      Extraocular Movements: Extraocular movements intact  Conjunctiva/sclera: Conjunctivae normal       Pupils: Pupils are equal, round, and reactive to light  Cardiovascular:      Rate and Rhythm: Normal rate and regular rhythm  Pulses: Normal pulses  Heart sounds: Normal heart sounds  Pulmonary:      Effort: Pulmonary effort is normal       Breath sounds: Normal breath sounds     Abdominal:      General: Bowel sounds are normal  Palpations: Abdomen is soft  Musculoskeletal: Normal range of motion  Skin:     General: Skin is warm  Capillary Refill: Capillary refill takes less than 2 seconds  Comments: See abscesses noted above   Neurological:      General: No focal deficit present  Mental Status: She is alert and oriented to person, place, and time  Psychiatric:         Mood and Affect: Mood normal          Behavior: Behavior normal          Vital Signs  ED Triage Vitals [03/27/21 1506]   Temperature Pulse Respirations Blood Pressure SpO2   99 4 °F (37 4 °C) 93 16 160/83 98 %      Temp Source Heart Rate Source Patient Position - Orthostatic VS BP Location FiO2 (%)   Tympanic Monitor Sitting Left arm --      Pain Score       8           Vitals:    03/27/21 1506   BP: 160/83   Pulse: 93   Patient Position - Orthostatic VS: Sitting         Visual Acuity      ED Medications  Medications   lidocaine (PF) (XYLOCAINE-MPF) 1 % injection 5 mL (5 mL Infiltration Given 3/27/21 1528)   acetaminophen (TYLENOL) tablet 975 mg (975 mg Oral Given 3/27/21 1541)       Diagnostic Studies  Results Reviewed     None                 No orders to display              Procedures  Incision and drain    Date/Time: 3/27/2021 3:42 PM  Performed by: Corinna Orta MD  Authorized by: Corinna Orta MD   Universal Protocol:  Consent: Verbal consent obtained  Risks and benefits: risks, benefits and alternatives were discussed  Consent given by: patient  Time out: Immediately prior to procedure a "time out" was called to verify the correct patient, procedure, equipment, support staff and site/side marked as required  Timeout called at: 3/27/2021 3:37 PM   Patient identity confirmed: verbally with patient      Patient location:  ED  Location:     Type:  Abscess    Size:  2 cm  Pre-procedure details:     Skin preparation:  Antiseptic wash  Anesthesia (see MAR for exact dosages):      Anesthesia method:  Local infiltration    Local anesthetic: Lidocaine 1% w/o epi  Procedure details:     Complexity:  Simple    Needle aspiration: no      Incision types:  Stab incision    Scalpel blade:  11    Approach:  Open    Incision depth:  Subcutaneous    Wound management:  Probed and deloculated    Drainage:  Purulent and bloody    Drainage amount: Moderate    Wound treatment:  Wound left open    Packing materials:  None  Post-procedure details:     Patient tolerance of procedure: Tolerated well, no immediate complications             ED Course                             SBIRT 20yo+      Most Recent Value   SBIRT (24 yo +)   In order to provide better care to our patients, we are screening all of our patients for alcohol and drug use  Would it be okay to ask you these screening questions? Yes Filed at: 03/27/2021 1528   Initial Alcohol Screen: US AUDIT-C    1  How often do you have a drink containing alcohol?  0 Filed at: 03/27/2021 1528   2  How many drinks containing alcohol do you have on a typical day you are drinking? 0 Filed at: 03/27/2021 1528   3b  FEMALE Any Age, or MALE 65+: How often do you have 4 or more drinks on one occassion? 0 Filed at: 03/27/2021 1528   Audit-C Score  0 Filed at: 03/27/2021 1528   NINFA: How many times in the past year have you    Used an illegal drug or used a prescription medication for non-medical reasons? Never Filed at: 03/27/2021 1528                    MDM    Disposition  Final diagnoses:   Facial abscess     Time reflects when diagnosis was documented in both MDM as applicable and the Disposition within this note     Time User Action Codes Description Comment    3/27/2021  3:39 PM Javier Buitrago Add [L02 01] Facial abscess       ED Disposition     ED Disposition Condition Date/Time Comment    Discharge Stable Sat Mar 27, 2021  3:39 PM Otilia Renae discharge to home/self care              Follow-up Information     Follow up With Specialties Details Why Contact Info Additional Information    [address], [address] [facility] [phone]  [address], [address], [address] [place], [place], [address]          Patient's Medications   Discharge Prescriptions    No medications on file     No discharge procedures on file      PDMP Review     None          ED Provider  Electronically Signed by           Chioma Croft MD  03/27/21 2000 Bandar Garvey MD  03/27/21 4159

## 2023-10-27 ENCOUNTER — HOSPITAL ENCOUNTER (EMERGENCY)
Facility: HOSPITAL | Age: 83
Discharge: HOME | End: 2023-10-27
Attending: EMERGENCY MEDICINE | Admitting: EMERGENCY MEDICINE
Payer: MEDICARE

## 2023-10-27 VITALS
TEMPERATURE: 98.7 F | OXYGEN SATURATION: 95 % | RESPIRATION RATE: 20 BRPM | BODY MASS INDEX: 30.3 KG/M2 | WEIGHT: 135.14 LBS | SYSTOLIC BLOOD PRESSURE: 170 MMHG | HEART RATE: 89 BPM | DIASTOLIC BLOOD PRESSURE: 97 MMHG

## 2023-10-27 DIAGNOSIS — Z76.0 MEDICATION REFILL: Primary | ICD-10-CM

## 2023-10-27 DIAGNOSIS — I10 HYPERTENSION: ICD-10-CM

## 2023-10-27 LAB — GLUCOSE SERPL-MCNC: 148 MG/DL (ref 65–140)

## 2023-10-27 PROCEDURE — 82948 REAGENT STRIP/BLOOD GLUCOSE: CPT

## 2023-10-27 PROCEDURE — 99284 EMERGENCY DEPT VISIT MOD MDM: CPT

## 2023-10-27 PROCEDURE — 99282 EMERGENCY DEPT VISIT SF MDM: CPT

## 2023-10-27 RX ORDER — LISINOPRIL 20 MG/1
20 TABLET ORAL DAILY
Qty: 30 TABLET | Refills: 0 | Status: SHIPPED | OUTPATIENT
Start: 2023-10-27 | End: 2023-11-26

## 2023-10-27 NOTE — ED PROVIDER NOTES
History  Chief Complaint   Patient presents with    Hypertension     Pt reports her blood pressure is high. Reports she has not taken her BP medication in two day. 80year old female, [place] speaking only, presenting today with concerns of high blood pressure, requesting medication refill. Denies any pains other than her normal arthritis pains. No headache or any vision changes. No CP or SOB. No nausea, vomiting, abdominal/back/flank pain. No fever or chills. Prior to Admission Medications   Prescriptions Last Dose Informant Patient Reported?  Taking?   acetaminophen (TYLENOL) 500 mg tablet   No No   Sig: Take 1 tablet (500 mg total) by mouth every 6 (six) hours as needed for mild pain   lisinopril (ZESTRIL) 20 mg tablet   Yes No   Sig: Take 20 mg by mouth daily   pravastatin (PRAVACHOL) 20 mg tablet   Yes No   Sig: Take 20 mg by mouth daily      Facility-Administered Medications: None       Past Medical History:   Diagnosis Date    Arthritis     Gastritis     Hyperlipidemia     Hypertension        Past Surgical History:   Procedure Laterality Date    CHOLECYSTECTOMY         Family History   Problem Relation Age of Onset    Breast cancer Mother     No Known Problems Father     No Known Problems Sister     No Known Problems Daughter     No Known Problems Maternal Grandmother     No Known Problems Maternal Grandfather     No Known Problems Paternal Grandmother     No Known Problems Paternal Grandfather     No Known Problems Sister     No Known Problems Sister     No Known Problems Sister     No Known Problems Sister     Breast cancer Sister     No Known Problems Sister     No Known Problems Sister     No Known Problems Sister     No Known Problems Daughter     No Known Problems Daughter     No Known Problems Daughter     No Known Problems Daughter     Prostate cancer Brother     No Known Problems Paternal Aunt     No Known Problems Paternal Aunt     No Known Problems Maternal Aunt     No Known Problems Maternal Aunt      I have reviewed and agree with the history as documented. E-Cigarette/Vaping    E-Cigarette Use Never User      E-Cigarette/Vaping Substances     Social History     Tobacco Use    Smoking status: Never    Smokeless tobacco: Never   Vaping Use    Vaping Use: Never used   Substance Use Topics    Alcohol use: No    Drug use: No       Review of Systems   Constitutional:  Negative for chills and fever. HENT:  Negative for ear pain and sore throat. Eyes:  Negative for pain and visual disturbance. Respiratory:  Negative for cough and shortness of breath. Cardiovascular:  Negative for chest pain and palpitations. Gastrointestinal:  Negative for abdominal pain, constipation, diarrhea, nausea and vomiting. Genitourinary:  Negative for dysuria and hematuria. Musculoskeletal:  Negative for arthralgias and back pain. Skin:  Negative for color change and rash. Neurological:  Negative for dizziness, seizures, syncope, weakness, light-headedness and headaches. All other systems reviewed and are negative. Physical Exam  Physical Exam  Vitals and nursing note reviewed. Constitutional:       General: She is not in acute distress. Appearance: She is well-developed. She is not ill-appearing. HENT:      Head: Normocephalic and atraumatic. Eyes:      Conjunctiva/sclera: Conjunctivae normal.   Cardiovascular:      Rate and Rhythm: Normal rate and regular rhythm. Heart sounds: No murmur heard. Pulmonary:      Effort: Pulmonary effort is normal. No respiratory distress. Breath sounds: Normal breath sounds. Abdominal:      Palpations: Abdomen is soft. Tenderness: There is no abdominal tenderness. Musculoskeletal:         General: No swelling. Cervical back: Neck supple. Skin:     General: Skin is warm and dry. Capillary Refill: Capillary refill takes less than 2 seconds. Neurological:      Mental Status: She is alert.    Psychiatric:         Mood and Affect: Mood normal.         Vital Signs  ED Triage Vitals [10/27/23 1403]   Temperature Pulse Respirations Blood Pressure SpO2   98.7 °F (37.1 °C) 89 20 170/97 95 %      Temp Source Heart Rate Source Patient Position - Orthostatic VS BP Location FiO2 (%)   Oral Monitor Sitting Left arm --      Pain Score       --           Vitals:    10/27/23 1403   BP: 170/97   Pulse: 89   Patient Position - Orthostatic VS: Sitting         Visual Acuity      ED Medications  Medications - No data to display    Diagnostic Studies  Results Reviewed       Procedure Component Value Units Date/Time    Fingerstick Glucose (POCT) [[id number]]  (Abnormal) Collected: 10/27/23 1428    Lab Status: Final result Updated: 10/27/23 1429     POC Glucose 148 mg/dl                    No orders to display              Procedures  Procedures         ED Course  ED Course as of 10/27/23 1534   Fri Oct 27, 2023   1434 Blood Pressure: 170/97   1434 Temperature: 98.7 °F (37.1 °C)   1434 Pulse: 89   1434 Respirations: 20   1434 SpO2: 95 %                               SBIRT 22yo+      Flowsheet Row Most Recent Value   Initial Alcohol Screen: US AUDIT-C     1. How often do you have a drink containing alcohol? 0 Filed at: 10/27/2023 1409   2. How many drinks containing alcohol do you have on a typical day you are drinking? 0 Filed at: 10/27/2023 1409   3a. Male UNDER 65: How often do you have five or more drinks on one occasion? 0 Filed at: 10/27/2023 1409   3b. FEMALE Any Age, or MALE 65+: How often do you have 4 or more drinks on one occassion? 0 Filed at: 10/27/2023 1409   Audit-C Score 0 Filed at: 10/27/2023 1409   NINFA: How many times in the past year have you. .. Used an illegal drug or used a prescription medication for non-medical reasons? Never Filed at: 10/27/2023 1409                      Medical Decision Making  80year old female presenting today for medication refill. States she would also like her blood glucose level checked.  Patient denying any changes in how she feels. No concerning red flags or hypertensive emergency. Lisinopril script sent with patient, per patient request.  Informed patient to follow up with her family doctor for more refills. Patient at time of discharge well-appearing in no acute distress, all questions answered. Patient agreeable to plan. Patient's vitals, lab/imaging results, diagnosis, and treatment plan were discussed with the patient. All new/changed medications were discussed with patient, specifically, route of administration, how often and when to take, and where they can be picked up. Strict return precautions as well as close follow up with PCP was discussed with the patient and the patient was agreeable to my recommendations. Patient verbally acknowledged understanding of the above communications. All labs reviewed and utilized in the medical decision making process (if labs were ordered). Portions of the record may have been created with voice recognition software. Occasional wrong word or "sound a like" substitutions may have occurred due to the inherent limitations of voice recognition software. Read the chart carefully and recognize, using context, where substitutions have occurred. Amount and/or Complexity of Data Reviewed  Labs: ordered. Risk  Prescription drug management. Disposition  Final diagnoses:   Medication refill   Hypertension     Time reflects when diagnosis was documented in both MDM as applicable and the Disposition within this note       Time User Action Codes Description Comment    10/27/2023  2:24 PM Diana Mcnulty Add [Z76.0] Medication refill     10/27/2023  2:29 PM Dawood Rose Hypertension           ED Disposition       ED Disposition   Discharge    Condition   Stable    Date/Time   Fri Oct 27, 2023 [address] discharge to home/self care.                    Follow-up Information       Follow up With Specialties Details Why Contact Info Additional Information    [facility] Emergency Department Emergency Medicine Go to  If symptoms worsen [address] [id number] [facility] Emergency Department    [facility] 2nd Floor Family Medicine Schedule an appointment as soon as possible for a visit   [address] [address]  [phone]               Discharge Medication List as of 10/27/2023  2:31 PM        START taking these medications    Details   !! lisinopril (ZESTRIL) 20 mg tablet Take 1 tablet (20 mg total) by mouth daily, Starting Fri 10/27/2023, Until Sun 11/26/2023, Print       !! - Potential duplicate medications found. Please discuss with provider. CONTINUE these medications which have NOT CHANGED    Details   acetaminophen (TYLENOL) 500 mg tablet Take 1 tablet (500 mg total) by mouth every 6 (six) hours as needed for mild pain, Starting Sun 3/22/2020, Print      !! lisinopril (ZESTRIL) 20 mg tablet Take 20 mg by mouth daily, Historical Med      pravastatin (PRAVACHOL) 20 mg tablet Take 20 mg by mouth daily, Historical Med       !! - Potential duplicate medications found. Please discuss with provider.               PDMP Review       None            ED Provider  Electronically Signed by             Dayna Mcdonnell PA-C  10/27/23 3327